# Patient Record
Sex: MALE | Race: BLACK OR AFRICAN AMERICAN | Employment: FULL TIME | URBAN - METROPOLITAN AREA
[De-identification: names, ages, dates, MRNs, and addresses within clinical notes are randomized per-mention and may not be internally consistent; named-entity substitution may affect disease eponyms.]

---

## 2017-10-14 ENCOUNTER — HOSPITAL ENCOUNTER (EMERGENCY)
Age: 47
Discharge: HOME | End: 2017-10-14
Attending: EMERGENCY MEDICINE
Payer: COMMERCIAL

## 2017-10-14 VITALS
TEMPERATURE: 98.2 F | OXYGEN SATURATION: 99 % | DIASTOLIC BLOOD PRESSURE: 74 MMHG | HEART RATE: 68 BPM | WEIGHT: 180 LBS | SYSTOLIC BLOOD PRESSURE: 124 MMHG | BODY MASS INDEX: 30.73 KG/M2 | HEIGHT: 64 IN | RESPIRATION RATE: 16 BRPM

## 2017-10-14 DIAGNOSIS — S05.02XA ABRASION OF LEFT CORNEA, INITIAL ENCOUNTER: Primary | ICD-10-CM

## 2017-10-14 DIAGNOSIS — H20.9 ANTERIOR UVEITIS: ICD-10-CM

## 2017-10-14 PROCEDURE — 74011000250 HC RX REV CODE- 250: Performed by: EMERGENCY MEDICINE

## 2017-10-14 PROCEDURE — 90471 IMMUNIZATION ADMIN: CPT | Performed by: EMERGENCY MEDICINE

## 2017-10-14 PROCEDURE — 74011250637 HC RX REV CODE- 250/637: Performed by: EMERGENCY MEDICINE

## 2017-10-14 PROCEDURE — 99284 EMERGENCY DEPT VISIT MOD MDM: CPT | Performed by: EMERGENCY MEDICINE

## 2017-10-14 PROCEDURE — 90715 TDAP VACCINE 7 YRS/> IM: CPT | Performed by: EMERGENCY MEDICINE

## 2017-10-14 PROCEDURE — 74011250636 HC RX REV CODE- 250/636: Performed by: EMERGENCY MEDICINE

## 2017-10-14 RX ORDER — TROPICAMIDE 10 MG/ML
2 SOLUTION/ DROPS OPHTHALMIC
Status: COMPLETED | OUTPATIENT
Start: 2017-10-14 | End: 2017-10-14

## 2017-10-14 RX ORDER — OXYCODONE HYDROCHLORIDE 5 MG/1
5-10 TABLET ORAL
Qty: 20 TAB | Refills: 0 | Status: SHIPPED | OUTPATIENT
Start: 2017-10-14 | End: 2018-09-13 | Stop reason: ALTCHOICE

## 2017-10-14 RX ORDER — ERYTHROMYCIN 5 MG/G
OINTMENT OPHTHALMIC
Status: DISCONTINUED | OUTPATIENT
Start: 2017-10-14 | End: 2017-10-14 | Stop reason: SDUPTHER

## 2017-10-14 RX ORDER — ERYTHROMYCIN 5 MG/G
OINTMENT OPHTHALMIC
Status: COMPLETED | OUTPATIENT
Start: 2017-10-14 | End: 2017-10-14

## 2017-10-14 RX ORDER — OXYCODONE HYDROCHLORIDE 5 MG/1
10 TABLET ORAL
Status: COMPLETED | OUTPATIENT
Start: 2017-10-14 | End: 2017-10-14

## 2017-10-14 RX ORDER — ONDANSETRON 8 MG/1
8 TABLET, ORALLY DISINTEGRATING ORAL
Status: COMPLETED | OUTPATIENT
Start: 2017-10-14 | End: 2017-10-14

## 2017-10-14 RX ORDER — BISMUTH SUBSALICYLATE 262 MG
1 TABLET,CHEWABLE ORAL DAILY
COMMUNITY
End: 2018-09-13 | Stop reason: ALTCHOICE

## 2017-10-14 RX ORDER — TETRACAINE HYDROCHLORIDE 5 MG/ML
1 SOLUTION OPHTHALMIC
Status: COMPLETED | OUTPATIENT
Start: 2017-10-14 | End: 2017-10-14

## 2017-10-14 RX ADMIN — FLUORESCEIN SODIUM 1 STRIP: 1 STRIP OPHTHALMIC at 20:58

## 2017-10-14 RX ADMIN — ERYTHROMYCIN: 5 OINTMENT OPHTHALMIC at 23:45

## 2017-10-14 RX ADMIN — OXYCODONE HYDROCHLORIDE 10 MG: 5 TABLET ORAL at 23:08

## 2017-10-14 RX ADMIN — TETANUS TOXOID, REDUCED DIPHTHERIA TOXOID AND ACELLULAR PERTUSSIS VACCINE, ADSORBED 0.5 ML: 5; 2.5; 8; 8; 2.5 SUSPENSION INTRAMUSCULAR at 23:45

## 2017-10-14 RX ADMIN — TETRACAINE HYDROCHLORIDE 1 DROP: 5 SOLUTION OPHTHALMIC at 20:58

## 2017-10-14 RX ADMIN — TROPICAMIDE 2 DROP: 10 SOLUTION/ DROPS OPHTHALMIC at 22:50

## 2017-10-14 RX ADMIN — ONDANSETRON 8 MG: 8 TABLET, ORALLY DISINTEGRATING ORAL at 23:08

## 2017-10-15 NOTE — ED NOTES
I have reviewed discharge instructions with the patient. The patient verbalized understanding. Patient left ED via ambulatory/ POV to home with spouse. Opportunity for questions and clarification provided. Patient given one scripts.

## 2017-10-15 NOTE — ED TRIAGE NOTES
Pt states that he was doing yard work last week and a branch hit him in the left eye and has been red and painful since

## 2017-10-15 NOTE — DISCHARGE INSTRUCTIONS
Use the antibiotic eye ointment 3 times a day  Pain medicines as needed  He definitely need to see the eye doctor Monday       Iritis: Care Instructions  Your Care Instructions    Iritis is an inflammation of the colored part of the eye. This part of the eye is called the iris. Iritis can cause redness and pain. It can make your eyes more sensitive to light. And it may make your pupils different sizes. Iritis is most often treated with prescription eyedrops. Treatment can usually prevent long-term problems with vision. Iritis usually lasts 6 to 8 weeks. You will need follow-up care with an eye doctor (ophthalmologist). Uveitis (say \"you-vee-EYE-tus\") and iridocyclitis (say \"qqq-zc-uzn-adryan-KLY-tus\") are other terms used to refer to this problem. Follow-up care is a key part of your treatment and safety. Be sure to make and go to all appointments, and call your doctor if you are having problems. It's also a good idea to know your test results and keep a list of the medicines you take. How can you care for yourself at home? · If the doctor gave you eyedrops, use them exactly as directed. Use the medicine for as long as instructed, even if your eye starts to look better soon. Call your doctor if you think you are having a problem with your eyedrops. Wash your hands well before and after you put in eyedrops. · To put in eyedrops or ointment:  ¨ Tilt your head back, and pull your lower eyelid down with one finger. ¨ Drop or squirt the medicine inside the lower lid. ¨ Close your eye for 30 to 60 seconds to let the drops or ointment move around. ¨ Do not touch the ointment or dropper tip to your eyelashes or any other surface. · Take an over-the-counter pain medicine, such as acetaminophen (Tylenol), ibuprofen (Advil, Motrin), or naproxen (Aleve). Read and follow all instructions on the label. · Make sure you go to all of your follow-up appointments. You will need a complete eye exam from an eye doctor.   When should you call for help? Call your doctor now or seek immediate medical care if:  · You have new or increasing eye pain. · You have vision changes in either eye. Watch closely for changes in your health, and be sure to contact your doctor if:  · You have new or worse symptoms. · You do not get better as expected. Where can you learn more? Go to http://nabor-min.info/. Enter B112 in the search box to learn more about \"Iritis: Care Instructions. \"  Current as of: March 3, 2017  Content Version: 11.3  © 8038-4212 [email or web address]. Care instructions adapted under license by [employer] (which disclaims liability or warranty for this information). If you have questions about a medical condition or this instruction, always ask your healthcare professional. [address] any warranty or liability for your use of this information. Uveitis: Care Instructions  Your Care Instructions    Uveitis (say \"you-vee-EYE-tus\") is swelling and tenderness of the middle layer of the eye. This area includes the colored part of the eye (iris), muscles, and blood vessels. One or both of your eyes may be swollen, red, and painful. You may have blurred vision. You may have gotten uveitis from an infection, but the cause is often not known. There are three types of uveitis. · Anterior uveitis is the most common type. This is pain and swelling of the front part of the eye. It is treated with eyedrops or ointment and usually lasts less than 6 weeks. · Intermediate uveitis affects the middle of the eye. It may be treated with eyedrops or with medicine given in a shot. It usually lasts longer than 6 weeks. · Posterior uveitis is the least common type. It affects the back of the eye. This is usually treated with medicine. It can last from a few weeks to a few years. It is important to treat uveitis.  Treatment can save your eyesight and avoid permanent damage to your eyes.  Follow-up care is a key part of your treatment and safety. Be sure to make and go to all appointments, and call your doctor if you are having problems. It's also a good idea to know your test results and keep a list of the medicines you take. How can you care for yourself at home? · Take your medicines exactly as prescribed. Call your doctor if you have any problems with your medicine. You will get more details on the specific medicines your doctor prescribes. · Use any prescribed eyedrops or ointments exactly as your doctor told you to. · Keep the eyedropper or bottle tip clean. · If you are using eyedrops and an ointment, put in the eyedrops before you use the ointment. · To put in eyedrops or ointment:  ¨ Tilt your head back, and pull your lower eyelid down with one finger. ¨ Drop or squirt the medicine inside the lower lid. ¨ Close your eye for 30 to 60 seconds to let the drops or ointment move around. ¨ Do not touch the ointment or dropper tip to your eyelashes or any other surface. · Wear sunglasses if light hurts your eyes. · Do not wear contact lenses until your eyes have healed. · Do not wear eye makeup until your eyes have healed. · Do not drive if you have blurred vision. When should you call for help? Call your doctor now or seek immediate medical care if:  · You have signs of an eye infection, such as:  ¨ Pus or thick discharge coming from the eye. ¨ Redness or swelling around the eye. ¨ A fever. · You have new or worse eye pain. · You have vision changes. · It feels like there is something in your eye. · Light hurts your eye. Watch closely for changes in your health, and be sure to contact your doctor if:  · You do not get better as expected. Where can you learn more? Go to http://nabor-min.info/. Enter V589 in the search box to learn more about \"Uveitis: Care Instructions. \"  Current as of: March 14, 2017  Content Version: 11.3  © 7291-6779 [employer]. Care instructions adapted under license by [employer] (which disclaims liability or warranty for this information). If you have questions about a medical condition or this instruction, always ask your healthcare professional. [address] any warranty or liability for your use of this information. Corneal Scratches: Care Instructions  Your Care Instructions    The cornea is the clear surface that covers the front of the eye. When a speck of dirt, a wood chip, an insect, or another object flies into your eye, it can cause a painful scratch on the cornea. Wearing contact lenses too long or rubbing your eyes can also scratch the cornea. Small scratches usually heal in a day or two. Deeper scratches may take longer. If you have had a foreign object removed from your eye or you have a corneal scratch, you will need to watch for infection and vision problems while your eye heals. Follow-up care is a key part of your treatment and safety. Be sure to make and go to all appointments, and call your doctor if you are having problems. It's also a good idea to know your test results and keep a list of the medicines you take. How can you care for yourself at home? · The doctor probably used a medicine during your exam to numb your eye. When it wears off in 30 to 60 minutes, your eye pain may come back. Take pain medicines exactly as directed. ¨ If the doctor gave you a prescription medicine for pain, take it as prescribed. ¨ If you are not taking a prescription pain medicine, ask your doctor if you can take an over-the-counter medicine. ¨ Do not take two or more pain medicines at the same time unless the doctor told you to. Many pain medicines have acetaminophen, which is Tylenol. Too much acetaminophen (Tylenol) can be harmful. · Do not rub your injured eye. Rubbing can make it worse. · Use the prescribed eyedrops or ointment as directed.  Be sure the dropper or bottle tip is clean. To put in eyedrops or ointment:  ¨ Tilt your head back, and pull your lower eyelid down with one finger. ¨ Drop or squirt the medicine inside the lower lid. ¨ Close your eye for 30 to 60 seconds to let the drops or ointment move around. ¨ Do not touch the ointment or dropper tip to your eyelashes or any other surface. · Do not use your contact lens in your hurt eye until your doctor says you can. Also, do not wear eye makeup until your eye has healed. · Do not drive if you have blurred vision. · Bright light may hurt. Sunglasses can help. · To prevent eye injuries in the future, wear safety glasses or goggles when you work with machines or tools, mow the lawn, or ride a bike or motorcycle. When should you call for help? Call your doctor now or seek immediate medical care if:  · You have signs of an eye infection, such as:  ¨ Pus or thick discharge coming from the eye. ¨ Redness or swelling around the eye. ¨ A fever. · You have new or worse eye pain. · You have vision changes. · It feels like there is something in your eye. · Light hurts your eye. Watch closely for changes in your health, and be sure to contact your doctor if:  · You do not get better as expected. Where can you learn more? Go to http://nabor-min.info/. Enter L391 in the search box to learn more about \"Corneal Scratches: Care Instructions. \"  Current as of: March 20, 2017  Content Version: 11.3  © 1463-1584 [employer]. Care instructions adapted under license by [employer] (which disclaims liability or warranty for this information). If you have questions about a medical condition or this instruction, always ask your healthcare professional. [address] any warranty or liability for your use of this information.

## 2017-10-19 NOTE — ED PROVIDER NOTES
Patient is a 49 y.o. male presenting with eye irritation. The history is provided by the patient. Red Eye    This is a new problem. The current episode started more than 2 days ago. The problem occurs constantly. The problem has been gradually worsening. The left eye is affected. The injury mechanism was a direct trauma. The pain is severe. Associated symptoms include foreign body sensation, photophobia, eye redness and pain. Pertinent negatives include no blurred vision, no decreased vision, no discharge and no blindness. He has tried eye drops for the symptoms. The treatment provided no relief. History reviewed. No pertinent past medical history. History reviewed. No pertinent surgical history. History reviewed. No pertinent family history. Social History     Social History    Marital status:      Spouse name: N/A    Number of children: N/A    Years of education: N/A     Occupational History    Not on file. Social History Main Topics    Smoking status: Never Smoker    Smokeless tobacco: Never Used    Alcohol use Not on file    Drug use: Not on file    Sexual activity: Not on file     Other Topics Concern    Not on file     Social History Narrative    No narrative on file         ALLERGIES: Review of patient's allergies indicates no known allergies. Review of Systems   Eyes: Positive for photophobia, pain and redness. Negative for blindness, blurred vision, discharge, itching and visual disturbance. Vitals:    10/14/17 2035 10/14/17 2352   BP: 131/81 124/74   Pulse: 72 68   Resp: 18 16   Temp: 98.2 °F (36.8 °C)    SpO2: 98% 99%   Weight: 81.6 kg (180 lb)    Height: 5' 4\" (1.626 m)             Physical Exam   Constitutional: He is oriented to person, place, and time. He appears well-developed and well-nourished. He appears distressed. HENT:   Head: Normocephalic and atraumatic.    Eyes: Conjunctivae and EOM are normal. Pupils are equal, round, and reactive to light. Right eye exhibits no discharge. Left eye exhibits no discharge. No scleral icterus. Very photophobic  Severe consensual photophobia,  +cell and flare in a.c.  Limbal abrasion at 3 and 9 o'clock   Neck: Normal range of motion. Neck supple. Pulmonary/Chest: Effort normal. No respiratory distress. Musculoskeletal: Normal range of motion. He exhibits no edema. Neurological: He is alert and oriented to person, place, and time. He exhibits normal muscle tone. cni 2-12 grossly   Skin: Skin is warm and dry. He is not diaphoretic. Psychiatric: He has a normal mood and affect. His behavior is normal.   Nursing note and vitals reviewed. MDM  Number of Diagnoses or Management Options  Abrasion of left cornea, initial encounter:   Anterior uveitis:   Diagnosis management comments: Medical decision making note:  Corneal abrasion from tree limb,  Severe uveitis  Va is o.k.  Dilate,  analgese, Abx, td, ophtho referral,  This concludes the \"medical decision making note\" part of this emergency department visit note.       ED Course       Procedures

## 2018-06-30 ENCOUNTER — HOSPITAL ENCOUNTER (EMERGENCY)
Age: 48
Discharge: LEFT BEFORE DISCHARGE | End: 2018-06-30
Attending: EMERGENCY MEDICINE
Payer: COMMERCIAL

## 2018-06-30 VITALS
HEIGHT: 64 IN | OXYGEN SATURATION: 99 % | HEART RATE: 77 BPM | RESPIRATION RATE: 16 BRPM | BODY MASS INDEX: 29.88 KG/M2 | DIASTOLIC BLOOD PRESSURE: 81 MMHG | TEMPERATURE: 98.1 F | WEIGHT: 175 LBS | SYSTOLIC BLOOD PRESSURE: 153 MMHG

## 2018-06-30 PROCEDURE — 75810000275 HC EMERGENCY DEPT VISIT NO LEVEL OF CARE: Performed by: EMERGENCY MEDICINE

## 2018-07-01 NOTE — ED TRIAGE NOTES
Pt states he has been having sharp pain in his neck like a crick in his name for the last week. States the pain is worse when he turns his head to the right and radiates down to shoulders. Denies taking anything for pain. Denies fall, MVA or altercation.

## 2018-10-16 PROBLEM — K62.5 BRBPR (BRIGHT RED BLOOD PER RECTUM): Status: ACTIVE | Noted: 2018-10-16

## 2018-10-16 PROBLEM — Z80.0 FAMILY HX OF COLON CANCER: Status: ACTIVE | Noted: 2018-10-16

## 2018-11-05 ENCOUNTER — HOSPITAL ENCOUNTER (OUTPATIENT)
Age: 48
Setting detail: OUTPATIENT SURGERY
Discharge: HOME | End: 2018-11-05
Attending: SURGERY | Admitting: SURGERY
Payer: COMMERCIAL

## 2018-11-05 VITALS
TEMPERATURE: 98 F | RESPIRATION RATE: 13 BRPM | WEIGHT: 171 LBS | BODY MASS INDEX: 29.19 KG/M2 | HEIGHT: 64 IN | SYSTOLIC BLOOD PRESSURE: 109 MMHG | DIASTOLIC BLOOD PRESSURE: 57 MMHG | OXYGEN SATURATION: 99 % | HEART RATE: 55 BPM

## 2018-11-05 PROCEDURE — 76040000019: Performed by: SURGERY

## 2018-11-05 PROCEDURE — 74011250636 HC RX REV CODE- 250/636: Performed by: SURGERY

## 2018-11-05 PROCEDURE — 74011250636 HC RX REV CODE- 250/636

## 2018-11-05 PROCEDURE — G0500 MOD SEDAT ENDO SERVICE >5YRS: HCPCS | Performed by: SURGERY

## 2018-11-05 RX ORDER — MIDAZOLAM HYDROCHLORIDE 1 MG/ML
.25-5 INJECTION, SOLUTION INTRAMUSCULAR; INTRAVENOUS
Status: DISCONTINUED | OUTPATIENT
Start: 2018-11-05 | End: 2018-11-05 | Stop reason: HOSPADM

## 2018-11-05 RX ORDER — FLUMAZENIL 0.1 MG/ML
0.2 INJECTION INTRAVENOUS
Status: DISCONTINUED | OUTPATIENT
Start: 2018-11-05 | End: 2018-11-05 | Stop reason: HOSPADM

## 2018-11-05 RX ORDER — SODIUM CHLORIDE 9 MG/ML
25 INJECTION, SOLUTION INTRAVENOUS CONTINUOUS
Status: DISCONTINUED | OUTPATIENT
Start: 2018-11-05 | End: 2018-11-05 | Stop reason: HOSPADM

## 2018-11-05 RX ORDER — FENTANYL CITRATE 50 UG/ML
25-100 INJECTION, SOLUTION INTRAMUSCULAR; INTRAVENOUS
Status: DISCONTINUED | OUTPATIENT
Start: 2018-11-05 | End: 2018-11-05

## 2018-11-05 RX ORDER — SODIUM CHLORIDE 0.9 % (FLUSH) 0.9 %
5-10 SYRINGE (ML) INJECTION AS NEEDED
Status: DISCONTINUED | OUTPATIENT
Start: 2018-11-05 | End: 2018-11-05 | Stop reason: HOSPADM

## 2018-11-05 RX ORDER — SODIUM CHLORIDE 0.9 % (FLUSH) 0.9 %
5-10 SYRINGE (ML) INJECTION EVERY 8 HOURS
Status: DISCONTINUED | OUTPATIENT
Start: 2018-11-05 | End: 2018-11-05 | Stop reason: HOSPADM

## 2018-11-05 RX ORDER — NALOXONE HYDROCHLORIDE 0.4 MG/ML
0.4 INJECTION, SOLUTION INTRAMUSCULAR; INTRAVENOUS; SUBCUTANEOUS
Status: DISCONTINUED | OUTPATIENT
Start: 2018-11-05 | End: 2018-11-05 | Stop reason: HOSPADM

## 2018-11-05 RX ORDER — FENTANYL CITRATE 50 UG/ML
25-100 INJECTION, SOLUTION INTRAMUSCULAR; INTRAVENOUS
Status: DISCONTINUED | OUTPATIENT
Start: 2018-11-05 | End: 2018-11-05 | Stop reason: HOSPADM

## 2018-11-05 RX ADMIN — FENTANYL CITRATE 75 MCG: 50 INJECTION, SOLUTION INTRAMUSCULAR; INTRAVENOUS at 09:56

## 2018-11-05 RX ADMIN — SODIUM CHLORIDE 25 ML/HR: 900 INJECTION, SOLUTION INTRAVENOUS at 09:41

## 2018-11-05 RX ADMIN — MIDAZOLAM HYDROCHLORIDE 3 MG: 1 INJECTION, SOLUTION INTRAMUSCULAR; INTRAVENOUS at 09:53

## 2018-11-05 RX ADMIN — MIDAZOLAM HYDROCHLORIDE 2 MG: 1 INJECTION, SOLUTION INTRAMUSCULAR; INTRAVENOUS at 09:57

## 2018-11-05 RX ADMIN — FENTANYL CITRATE 25 MCG: 50 INJECTION, SOLUTION INTRAMUSCULAR; INTRAVENOUS at 09:57

## 2018-11-05 NOTE — H&P
Karli Patient 
 
11/5/2018 Subjective:  
 
Patient is a 51 y.o. male who was sent by their primary care physician for screening colonoscopy. Pt denies any symptoms of abdominal pain, change in bowel habits, blood per rectum, unexplained weight loss, or other symptoms that would be of concern for colon cancer. Patient Active Problem List  
 Diagnosis Date Noted  BRBPR (bright red blood per rectum) 10/16/2018  Family hx of colon cancer 10/16/2018 No past medical history on file. No past surgical history on file. Cannot display prior to admission medications because the patient has not been admitted in this contact. No Known Allergies Social History Tobacco Use  Smoking status: Never Smoker  Smokeless tobacco: Never Used Substance Use Topics  Alcohol use: Not on file Social History Social History Narrative  Not on file No family history on file. No current facility-administered medications for this encounter. Current Outpatient Medications Medication Sig  
 aspirin delayed-release 81 mg tablet Once daily cheaper over-the-counter  multivitamin (ONE A DAY) tablet Once daily cheaper over-the-counter Review of Systems Pertinent items are noted in HPI. Objective: There were no vitals filed for this visit. PHYSICAL EXAM  
 
Gen- the patient is well developed and in no acute distress HEENT- PERRL, EOMI, no scleral icterus 
     nose without alar flaring or epistaxis 
                oral muscosa moist without cyanosis Neck- no JVD or retractions Lungs-clear Heart- RRR without M,G,R Abd- soft and non-tender; with positive bowel sounds. Ext- warm without cyanosis. There is no lower leg edema. Skin- no jaundice or rashes, no wounds Neuro- alert and oriented x 3. No gross sensorimotor deficits are present. Plan:  
 
Age appropriate screening colonoscopy.  I have discussed the risks, benefits and alternatives of surgery. Pt understands and wishes to proceed. Consent obtained Zulma Hicks MD

## 2018-11-05 NOTE — DISCHARGE INSTRUCTIONS
Gastrointestinal Colonoscopy/Flexible Sigmoidoscopy - Lower Exam Discharge Instructions  1. Call Dr. Adore Jama at [phone] for any problems or questions. 2. Contact the doctors office for follow up appointment as directed  3. Medication may cause drowsiness for several hours, therefore, do not drive or operate machinery for remainder of the day. 4. No alcohol today. 5. Ordinarily, you may resume regular diet and activity after exam unless otherwise specified by your physician. 6. Because of air put into your colon during exam, you may experience some abdominal distension, relieved by the passage of gas, for several hours. 7. Contact your physician if you have any of the following:  a. Excessive amount of bleeding - large amount when having a bowel movement. Occasional specks of blood with bowel movement would not be unusual.  b. Severe abdominal pain  c.  Fever or Chills  Any additional instructions:  Repeat colonoscopy in ten years

## 2018-11-05 NOTE — ROUTINE PROCESS
VSS. No complaints noted. Education reviewed and signed with stewart. Pt discharged via wheelchair by Glenn.

## 2018-11-27 NOTE — PROCEDURES
[address] [facility] [phone]  Colonoscopy Report  Hollis Mojica    Admit date: 2018    MRN: [id number]     : 1970     Age: 51 y.o.          2018  Screening colonoscopy   Post Procedure Diagnosis:Normal colon  Repeat exam in 10 years    Indications:  Pt was sent by their primary care physician for screening colonoscopy. Risks and benefits of the procedure were discussed, and consent was obtained. Procedure:  Pt was brought back to the endoscopy suite, placed in left lateral decubitus position, and IV sedation was administered. 5mg versed and 100mcg fentanyl were administered intravenously, and vital signs were monitored continuously during this time. Digital rectal exam was performed with no findings. Colonoscope was inserted into the anus, and advanced with insufflation around the entire length of the colon to the ileocecal valve. The valve was positively identified with the visual landmarks and palpation in the RLQ. The scope was then withdrawn slowly, inspecting the mucosa for any irregularities. Findings included normal colon. Bowel prep was good . Insufflation was suctioned out at the completion of the procedure, and the patient was transferred to the recovery area in stable condition. There were no complications. Pt tolerated the procedure well. Specimens none    Estimated Blood Loss: 0-minimum.     Stanley Bass MD

## 2019-02-25 ENCOUNTER — HOSPITAL ENCOUNTER (OUTPATIENT)
Dept: NON INVASIVE DIAGNOSTICS | Age: 49
Discharge: HOME | End: 2019-02-25
Attending: FAMILY MEDICINE
Payer: COMMERCIAL

## 2019-02-25 DIAGNOSIS — R06.02 SOB (SHORTNESS OF BREATH): ICD-10-CM

## 2019-02-25 DIAGNOSIS — R94.31 ABNORMAL EKG: ICD-10-CM

## 2019-02-25 PROCEDURE — 93306 TTE W/DOPPLER COMPLETE: CPT

## 2021-08-25 ENCOUNTER — HOSPITAL ENCOUNTER (EMERGENCY)
Age: 51
Discharge: HOME | End: 2021-08-26
Attending: EMERGENCY MEDICINE | Admitting: EMERGENCY MEDICINE
Payer: COMMERCIAL

## 2021-08-25 ENCOUNTER — APPOINTMENT (OUTPATIENT)
Dept: GENERAL RADIOLOGY | Age: 51
End: 2021-08-25
Attending: EMERGENCY MEDICINE
Payer: COMMERCIAL

## 2021-08-25 DIAGNOSIS — R07.9 CHEST PAIN, UNSPECIFIED TYPE: Primary | ICD-10-CM

## 2021-08-25 LAB
ALBUMIN SERPL-MCNC: 4.2 G/DL (ref 3.5–5)
ALBUMIN/GLOB SERPL: 0.9 {RATIO} (ref 1.2–3.5)
ALP SERPL-CCNC: 81 U/L (ref 50–136)
ALT SERPL-CCNC: 45 U/L (ref 12–65)
ANION GAP SERPL CALC-SCNC: 4 MMOL/L (ref 7–16)
AST SERPL-CCNC: 26 U/L (ref 15–37)
BASOPHILS # BLD: 0 K/UL (ref 0–0.2)
BASOPHILS NFR BLD: 1 % (ref 0–2)
BILIRUB SERPL-MCNC: 0.3 MG/DL (ref 0.2–1.1)
BUN SERPL-MCNC: 11 MG/DL (ref 6–23)
CALCIUM SERPL-MCNC: 9.6 MG/DL (ref 8.3–10.4)
CHLORIDE SERPL-SCNC: 108 MMOL/L (ref 98–107)
CO2 SERPL-SCNC: 29 MMOL/L (ref 21–32)
CREAT SERPL-MCNC: 1.37 MG/DL (ref 0.8–1.5)
DIFFERENTIAL METHOD BLD: ABNORMAL
EOSINOPHIL # BLD: 0.1 K/UL (ref 0–0.8)
EOSINOPHIL NFR BLD: 2 % (ref 0.5–7.8)
ERYTHROCYTE [DISTWIDTH] IN BLOOD BY AUTOMATED COUNT: 13.3 % (ref 11.9–14.6)
GLOBULIN SER CALC-MCNC: 4.8 G/DL (ref 2.3–3.5)
GLUCOSE SERPL-MCNC: 85 MG/DL (ref 65–100)
HCT VFR BLD AUTO: 45.1 % (ref 41.1–50.3)
HGB BLD-MCNC: 14.4 G/DL (ref 13.6–17.2)
IMM GRANULOCYTES # BLD AUTO: 0 K/UL (ref 0–0.5)
IMM GRANULOCYTES NFR BLD AUTO: 0 % (ref 0–5)
LIPASE SERPL-CCNC: 168 U/L (ref 73–393)
LYMPHOCYTES # BLD: 3.3 K/UL (ref 0.5–4.6)
LYMPHOCYTES NFR BLD: 46 % (ref 13–44)
MAGNESIUM SERPL-MCNC: 2 MG/DL (ref 1.8–2.4)
MCH RBC QN AUTO: 27.2 PG (ref 26.1–32.9)
MCHC RBC AUTO-ENTMCNC: 31.9 G/DL (ref 31.4–35)
MCV RBC AUTO: 85.1 FL (ref 79.6–97.8)
MONOCYTES # BLD: 0.5 K/UL (ref 0.1–1.3)
MONOCYTES NFR BLD: 7 % (ref 4–12)
NEUTS SEG # BLD: 3.2 K/UL (ref 1.7–8.2)
NEUTS SEG NFR BLD: 45 % (ref 43–78)
NRBC # BLD: 0 K/UL (ref 0–0.2)
PLATELET # BLD AUTO: 335 K/UL (ref 150–450)
PMV BLD AUTO: 8.8 FL (ref 9.4–12.3)
POTASSIUM SERPL-SCNC: 3.9 MMOL/L (ref 3.5–5.1)
PROT SERPL-MCNC: 9 G/DL (ref 6.3–8.2)
RBC # BLD AUTO: 5.3 M/UL (ref 4.23–5.6)
SODIUM SERPL-SCNC: 141 MMOL/L (ref 136–145)
TROPONIN-HIGH SENSITIVITY: 34.8 PG/ML (ref 0–14)
WBC # BLD AUTO: 7.3 K/UL (ref 4.3–11.1)

## 2021-08-25 PROCEDURE — 71046 X-RAY EXAM CHEST 2 VIEWS: CPT

## 2021-08-25 PROCEDURE — 80053 COMPREHEN METABOLIC PANEL: CPT

## 2021-08-25 PROCEDURE — 85025 COMPLETE CBC W/AUTO DIFF WBC: CPT

## 2021-08-25 PROCEDURE — 74011250637 HC RX REV CODE- 250/637: Performed by: EMERGENCY MEDICINE

## 2021-08-25 PROCEDURE — 84484 ASSAY OF TROPONIN QUANT: CPT

## 2021-08-25 PROCEDURE — 83735 ASSAY OF MAGNESIUM: CPT

## 2021-08-25 PROCEDURE — 83690 ASSAY OF LIPASE: CPT

## 2021-08-25 PROCEDURE — 99284 EMERGENCY DEPT VISIT MOD MDM: CPT

## 2021-08-25 PROCEDURE — 93005 ELECTROCARDIOGRAM TRACING: CPT | Performed by: EMERGENCY MEDICINE

## 2021-08-25 RX ORDER — GUAIFENESIN 100 MG/5ML
324 LIQUID (ML) ORAL
Status: COMPLETED | OUTPATIENT
Start: 2021-08-25 | End: 2021-08-25

## 2021-08-25 RX ORDER — SODIUM CHLORIDE 0.9 % (FLUSH) 0.9 %
5-10 SYRINGE (ML) INJECTION EVERY 8 HOURS
Status: DISCONTINUED | OUTPATIENT
Start: 2021-08-25 | End: 2021-08-26 | Stop reason: HOSPADM

## 2021-08-25 RX ORDER — SODIUM CHLORIDE 0.9 % (FLUSH) 0.9 %
5-10 SYRINGE (ML) INJECTION AS NEEDED
Status: DISCONTINUED | OUTPATIENT
Start: 2021-08-25 | End: 2021-08-26 | Stop reason: HOSPADM

## 2021-08-25 RX ADMIN — ASPIRIN 324 MG: 81 TABLET, CHEWABLE ORAL at 22:05

## 2021-08-26 VITALS
RESPIRATION RATE: 16 BRPM | TEMPERATURE: 98.3 F | BODY MASS INDEX: 31.58 KG/M2 | WEIGHT: 185 LBS | OXYGEN SATURATION: 96 % | DIASTOLIC BLOOD PRESSURE: 79 MMHG | HEART RATE: 54 BPM | SYSTOLIC BLOOD PRESSURE: 155 MMHG | HEIGHT: 64 IN

## 2021-08-26 LAB
ATRIAL RATE: 62 BPM
CALCULATED P AXIS, ECG09: 69 DEGREES
CALCULATED R AXIS, ECG10: 47 DEGREES
CALCULATED T AXIS, ECG11: 27 DEGREES
DIAGNOSIS, 93000: NORMAL
P-R INTERVAL, ECG05: 190 MS
Q-T INTERVAL, ECG07: 394 MS
QRS DURATION, ECG06: 86 MS
QTC CALCULATION (BEZET), ECG08: 399 MS
TROPONIN-HIGH SENSITIVITY: 31.1 PG/ML (ref 0–14)
VENTRICULAR RATE, ECG03: 62 BPM

## 2021-08-26 NOTE — ED PROVIDER NOTES
Patient with no past medical history and non-smoker presents with left-sided chest tightness going to the left elbow lasting for couple minutes at a time intermittently over the past 2 days. No specific trigger. No nausea numbness shortness of breath or diaphoresis. 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 No heavy lifting recently. Last episode just before arrival here. The history is provided by the patient. No  was used. Chest Pain   This is a new problem. The current episode started 2 days ago. The problem has been resolved. Duration of episode(s) is 3 minutes. The problem occurs daily. The pain is associated with normal activity. The pain is present in the left side. The pain is mild. The quality of the pain is described as tightness. The pain radiates to the left arm. Pertinent negatives include no abdominal pain, no back pain, no cough, no diaphoresis, no dizziness, no exertional chest pressure, no fever, no headaches, no irregular heartbeat, no leg pain, no lower extremity edema, no malaise/fatigue, no nausea, no numbness, no palpitations, no shortness of breath, no vomiting and no weakness. He has tried nothing for the symptoms. Risk factors include male gender. His past medical history does not include DM or HTN. Pertinent negatives include no cardiac stents. No past medical history on file.     Past Surgical History:   Procedure Laterality Date    COLONOSCOPY N/A 11/5/2018    COLONOSCOPY performed by Henry Chapman MD at [facility] END[facility]COPY         Family History:   Family history unknown: Yes       Social History     Socioeconomic History    Marital status:      Spouse name: Not on file    Number of children: Not on file    Years of education: Not on file    Highest education level: Not on file   Occupational History    Not on file   Tobacco Use    Smoking status: Never Smoker    Smokeless tobacco: Never Used   Substance and Sexual Activity    Alcohol use: No    Drug use: No    Sexual activity: Not on file   Other Topics Concern    Not on file   Social History Narrative    Not on file     Social Determinants of Health     Financial Resource Strain:     Difficulty of Paying Living Expenses:    Food Insecurity:     Worried About Running Out of Food in the Last Year:     [address] in the Last Year:    Transportation Needs:     Lack of Transportation (Medical):  Lack of Transportation (Non-Medical):    Physical Activity:     Days of Exercise per Week:     Minutes of Exercise per Session:    Stress:     Feeling of Stress :    Social Connections:     Frequency of Communication with Friends and Family:     Frequency of Social Gatherings with Friends and Family:     Attends Orthodox Services:     Active Member of Clubs or Organizations:     Attends Club or Organization Meetings:     Marital Status:    Intimate Partner Violence:     Fear of Current or Ex-Partner:     Emotionally Abused:     Physically Abused:     Sexually Abused: ALLERGIES: Patient has no known allergies. Review of Systems   Constitutional: Negative for chills, diaphoresis, fever and malaise/fatigue. HENT: Negative for rhinorrhea and sore throat. Eyes: Negative for pain and redness. Respiratory: Negative for cough, chest tightness, shortness of breath and wheezing. Cardiovascular: Positive for chest pain. Negative for palpitations and leg swelling. Gastrointestinal: Negative for abdominal pain, diarrhea, nausea and vomiting. Genitourinary: Negative for dysuria and hematuria. Musculoskeletal: Negative for back pain, gait problem, neck pain and neck stiffness. Skin: Negative for color change and rash. Neurological: Negative for dizziness, weakness, numbness and headaches. All other systems reviewed and are negative.       Vitals:    08/25/21 2124   BP: (!) 187/84   Pulse: 64   Resp: 16   Temp: 97.8 °F (36.6 °C)   SpO2: 99%   Weight: 83.9 kg (185 lb)   Height: 5' 4\" (1.626 m)            Physical Exam  Constitutional:       Appearance: Normal appearance. He is well-developed. HENT:      Head: Normocephalic and atraumatic. Cardiovascular:      Rate and Rhythm: Normal rate and regular rhythm. Pulmonary:      Effort: Pulmonary effort is normal.      Breath sounds: Normal breath sounds. Chest:      Chest wall: No tenderness. Abdominal:      General: Bowel sounds are normal.      Palpations: Abdomen is soft. Tenderness: There is no abdominal tenderness. Musculoskeletal:         General: Normal range of motion. Cervical back: Normal range of motion and neck supple. Skin:     General: Skin is warm and dry. Neurological:      General: No focal deficit present. Mental Status: He is alert and oriented to person, place, and time. MDM  Number of Diagnoses or Management Options  Diagnosis management comments: Patient with no EKG changes and 2 - troponins. Feeling well here. Will discharge. Amount and/or Complexity of Data Reviewed  Clinical lab tests: ordered and reviewed  Tests in the radiology section of CPT®: ordered and reviewed  Tests in the medicine section of CPT®: ordered and reviewed    Patient Progress  Patient progress: stable         Procedures      EKG: normal sinus rhythm, nonspecific ST and T waves changes. Rate 62. XR CHEST PA LAT (Final result)  Result time 08/25/21 22:01:55  Final result by Leonarda Quintana MD (08/25/21 22:01:55)                Impression:    Unremarkable two-view chest.               Narrative:    History: Chest Pain     Two views chest     Findings: The lungs are well expanded and clear.  The cardiac silhouette, and   mediastinal contour, and osseous structures are normal.                   Results Include:    Recent Results (from the past 24 hour(s))   TROPONIN-HIGH SENSITIVITY    Collection Time: 08/25/21  9:26 PM   Result Value Ref Range    Troponin-High Sensitivity 34.8 (H) 0 - 14 pg/mL   CBC WITH AUTOMATED DIFF    Collection Time: 08/25/21  9:26 PM   Result Value Ref Range    WBC 7.3 4.3 - 11.1 K/uL    RBC 5.30 4.23 - 5.6 M/uL    HGB 14.4 13.6 - 17.2 g/dL    HCT 45.1 41.1 - 50.3 %    MCV 85.1 79.6 - 97.8 FL    MCH 27.2 26.1 - 32.9 PG    MCHC 31.9 31.4 - 35.0 g/dL    RDW 13.3 11.9 - 14.6 %    PLATELET 834 390 - 416 K/uL    MPV 8.8 (L) 9.4 - 12.3 FL    ABSOLUTE NRBC 0.00 0.0 - 0.2 K/uL    DF AUTOMATED      NEUTROPHILS 45 43 - 78 %    LYMPHOCYTES 46 (H) 13 - 44 %    MONOCYTES 7 4.0 - 12.0 %    EOSINOPHILS 2 0.5 - 7.8 %    BASOPHILS 1 0.0 - 2.0 %    IMMATURE GRANULOCYTES 0 0.0 - 5.0 %    ABS. NEUTROPHILS 3.2 1.7 - 8.2 K/UL    ABS. LYMPHOCYTES 3.3 0.5 - 4.6 K/UL    ABS. MONOCYTES 0.5 0.1 - 1.3 K/UL    ABS. EOSINOPHILS 0.1 0.0 - 0.8 K/UL    ABS. BASOPHILS 0.0 0.0 - 0.2 K/UL    ABS. IMM. GRANS. 0.0 0.0 - 0.5 K/UL   METABOLIC PANEL, COMPREHENSIVE    Collection Time: 08/25/21  9:26 PM   Result Value Ref Range    Sodium 141 136 - 145 mmol/L    Potassium 3.9 3.5 - 5.1 mmol/L    Chloride 108 (H) 98 - 107 mmol/L    CO2 29 21 - 32 mmol/L    Anion gap 4 (L) 7 - 16 mmol/L    Glucose 85 65 - 100 mg/dL    BUN 11 6 - 23 MG/DL    Creatinine 1.37 0.8 - 1.5 MG/DL    GFR est AA >60 >60 ml/min/1.73m2    GFR est non-AA 58 (L) >60 ml/min/1.73m2    Calcium 9.6 8.3 - 10.4 MG/DL    Bilirubin, total 0.3 0.2 - 1.1 MG/DL    ALT (SGPT) 45 12 - 65 U/L    AST (SGOT) 26 15 - 37 U/L    Alk.  phosphatase 81 50 - 136 U/L    Protein, total 9.0 (H) 6.3 - 8.2 g/dL    Albumin 4.2 3.5 - 5.0 g/dL    Globulin 4.8 (H) 2.3 - 3.5 g/dL    A-G Ratio 0.9 (L) 1.2 - 3.5     LIPASE    Collection Time: 08/25/21  9:26 PM   Result Value Ref Range    Lipase 168 73 - 393 U/L   MAGNESIUM    Collection Time: 08/25/21  9:26 PM   Result Value Ref Range    Magnesium 2.0 1.8 - 2.4 mg/dL   EKG, 12 LEAD, INITIAL    Collection Time: 08/25/21  9:28 PM   Result Value Ref Range    Ventricular Rate 62 BPM    Atrial Rate 62 BPM    P-R Interval 190 ms    QRS Duration 86 ms    Q-T Interval 394 ms    QTC Calculation (Bezet) 399 ms    Calculated P Axis 69 degrees    Calculated R Axis 47 degrees    Calculated T Axis 27 degrees    Diagnosis       Normal sinus rhythm  Normal ECG  No previous ECGs available     TROPONIN-HIGH SENSITIVITY    Collection Time: 08/25/21 11:41 PM   Result Value Ref Range    Troponin-High Sensitivity 31.1 (H) 0 - 14 pg/mL

## 2021-08-26 NOTE — ED NOTES
I have reviewed discharge instructions with the patient. The patient verbalized understanding. Patient left ED via Discharge Method: ambulatory to Home with self. Opportunity for questions and clarification provided. Patient given 0 scripts. To continue your aftercare when you leave the hospital, you may receive an automated call from our care team to check in on how you are doing. This is a free service and part of our promise to provide the best care and service to meet your aftercare needs.  If you have questions, or wish to unsubscribe from this service please call [phone]. Thank you for Choosing our [facility] Emergency Department.

## 2021-08-26 NOTE — ED TRIAGE NOTES
Arrives with face mask in place. Reports left sided chest \"tightness\", radiating down to left elbow. Onset yesterday. States intermittent since onset. Increased pain and tightness with movement of left arm. 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 Denies heavy lifting. Denies shortness of breath, cough, fever/chills, n/v/d, lightheadedness/dizziness. Denies cardiac hx.

## 2022-03-18 PROBLEM — Z80.0 FAMILY HX OF COLON CANCER: Status: ACTIVE | Noted: 2018-10-16

## 2022-03-19 PROBLEM — K62.5 BRBPR (BRIGHT RED BLOOD PER RECTUM): Status: ACTIVE | Noted: 2018-10-16

## 2025-01-25 ENCOUNTER — HOSPITAL ENCOUNTER (EMERGENCY)
Age: 55
Discharge: HOME | End: 2025-01-26
Payer: COMMERCIAL

## 2025-01-25 ENCOUNTER — APPOINTMENT (OUTPATIENT)
Dept: GENERAL RADIOLOGY | Age: 55
End: 2025-01-25
Payer: COMMERCIAL

## 2025-01-25 DIAGNOSIS — M25.562 ACUTE PAIN OF LEFT KNEE: Primary | ICD-10-CM

## 2025-01-25 PROCEDURE — 96372 THER/PROPH/DIAG INJ SC/IM: CPT

## 2025-01-25 PROCEDURE — 99284 EMERGENCY DEPT VISIT MOD MDM: CPT

## 2025-01-25 PROCEDURE — 73562 X-RAY EXAM OF KNEE 3: CPT

## 2025-01-25 PROCEDURE — 6360000002 HC RX W HCPCS: Performed by: PHYSICIAN ASSISTANT

## 2025-01-25 RX ORDER — MELOXICAM 15 MG/1
15 TABLET ORAL DAILY
Qty: 90 TABLET | Refills: 1 | Status: SHIPPED | OUTPATIENT
Start: 2025-01-25

## 2025-01-25 RX ORDER — KETOROLAC TROMETHAMINE 30 MG/ML
30 INJECTION, SOLUTION INTRAMUSCULAR; INTRAVENOUS ONCE
Status: COMPLETED | OUTPATIENT
Start: 2025-01-25 | End: 2025-01-25

## 2025-01-25 RX ADMIN — KETOROLAC TROMETHAMINE 30 MG: 30 INJECTION, SOLUTION INTRAMUSCULAR at 22:22

## 2025-01-25 ASSESSMENT — LIFESTYLE VARIABLES
HOW OFTEN DO YOU HAVE A DRINK CONTAINING ALCOHOL: NEVER
HOW MANY STANDARD DRINKS CONTAINING ALCOHOL DO YOU HAVE ON A TYPICAL DAY: PATIENT DOES NOT DRINK

## 2025-01-25 ASSESSMENT — PAIN DESCRIPTION - ORIENTATION
ORIENTATION: LEFT
ORIENTATION: LEFT

## 2025-01-25 ASSESSMENT — PAIN DESCRIPTION - DESCRIPTORS
DESCRIPTORS: ACHING
DESCRIPTORS: ACHING

## 2025-01-25 ASSESSMENT — PAIN DESCRIPTION - PAIN TYPE: TYPE: ACUTE PAIN

## 2025-01-25 ASSESSMENT — PAIN - FUNCTIONAL ASSESSMENT: PAIN_FUNCTIONAL_ASSESSMENT: 0-10

## 2025-01-25 ASSESSMENT — PAIN SCALES - GENERAL
PAINLEVEL_OUTOF10: 3
PAINLEVEL_OUTOF10: 10

## 2025-01-25 ASSESSMENT — PAIN DESCRIPTION - LOCATION
LOCATION: KNEE
LOCATION: KNEE;LEG

## 2025-01-26 VITALS
RESPIRATION RATE: 19 BRPM | BODY MASS INDEX: 31.58 KG/M2 | WEIGHT: 185 LBS | TEMPERATURE: 98.1 F | OXYGEN SATURATION: 97 % | DIASTOLIC BLOOD PRESSURE: 71 MMHG | SYSTOLIC BLOOD PRESSURE: 146 MMHG | HEIGHT: 64 IN | HEART RATE: 97 BPM

## 2025-01-26 NOTE — ED TRIAGE NOTES
C/o Left knee and leg pain x 4 days, no known injury, reports he was seen at [facility] and  with a sprain and given a Steroid shot and pain medication with improvement that day, pain and swelling noted again the next day.

## 2025-01-26 NOTE — ED NOTES
Patient mobility status  with no difficulty.     I have reviewed discharge instructions with the patient.  The patient verbalized understanding.    Patient left ED via Discharge Method: ambulatory to Home with Spouse.    Opportunity for questions and clarification provided.     Patient given 1 scripts.              Christopher, Yue, RN  01/26/25 0014

## 2025-01-26 NOTE — DISCHARGE INSTRUCTIONS
Everything looks great this evening. Again, no abnormality on XR. I am going to send a referral for you to orthopedics and I would like you to follow up with them. Begin taking the antiinflammatory medication to help with the pain and swelling. Elevate your knee when not using it and some mild compression and icepak may be of some benefit.

## 2025-01-26 NOTE — ED PROVIDER NOTES
Emergency Department Provider Note       PCP: No primary care provider on file.   Age: 54 y.o.   Sex: male     DISPOSITION Decision To Discharge 01/25/2025 11:45:50 PM   DISPOSITION CONDITION Stable            ICD-10-CM    1. Acute pain of left knee  M25.562 [facility]          Medical Decision Making     Presents with left knee swelling. Started a few days ago no associated trauma. XR negative here for anything acute. Will begin on antiinflammatory medications and send referral for orthopedic outpatient follow up. D/w patient, amenable to discharge and antiinflammatory medication initiation.      1 acute complicated illness or injury.  Over the counter drug management performed.  Shared medical decision making was utilized in creating the patients health plan today.  I independently ordered and reviewed each unique test.  I interpreted the X-rays no fracture.    History     This is a 54-year-old male presents with complaints of left-sided knee pain.  Reports about 2 days ago developed the left knee pain.  He went to outside facility was given a steroid shot as well as anti-inflammatory medication after an unremarkable x-ray.  He was not sent home with any medication.  He reports that the steroid shot was into his hip and not into the joint itself.  States initial improvement however pain returned yesterday.  Patient works construction.  He does not note any specific injury to the knee itself that would be associated with the pain and swelling.  He is not taking any medications at home for his symptoms.    The history is provided by the patient. No  was used.     Physical Exam     Vitals signs and nursing note reviewed:  Vitals:    01/25/25 2147   BP: 112/88   Pulse: 83   Resp: 18   Temp: 98.1 °F (36.7 °C)   TempSrc: Oral   SpO2: 100%   Weight: 83.9 kg (185 lb)   Height: 1.626 m (5' 4\")      Physical Exam  Vitals and nursing note reviewed.   Constitutional:

## 2025-02-12 ENCOUNTER — OFFICE VISIT (OUTPATIENT)
Dept: ORTHOPEDIC SURGERY | Age: 55
End: 2025-02-12
Payer: COMMERCIAL

## 2025-02-12 DIAGNOSIS — M17.12 OSTEOARTHRITIS OF LEFT KNEE, UNSPECIFIED OSTEOARTHRITIS TYPE: ICD-10-CM

## 2025-02-12 DIAGNOSIS — M25.562 LEFT KNEE PAIN, UNSPECIFIED CHRONICITY: Primary | ICD-10-CM

## 2025-02-12 PROCEDURE — 20611 DRAIN/INJ JOINT/BURSA W/US: CPT | Performed by: PHYSICIAN ASSISTANT

## 2025-02-12 PROCEDURE — 99204 OFFICE O/P NEW MOD 45 MIN: CPT | Performed by: PHYSICIAN ASSISTANT

## 2025-02-12 RX ORDER — TRIAMCINOLONE ACETONIDE 40 MG/ML
40 INJECTION, SUSPENSION INTRA-ARTICULAR; INTRAMUSCULAR ONCE
Status: COMPLETED | OUTPATIENT
Start: 2025-02-12 | End: 2025-02-12

## 2025-02-12 RX ADMIN — TRIAMCINOLONE ACETONIDE 40 MG: 40 INJECTION, SUSPENSION INTRA-ARTICULAR; INTRAMUSCULAR at 09:17

## 2025-02-12 NOTE — PROGRESS NOTES
strength is good.  Sensation is intact to light touch bilaterally.  Their judgment and insight are normal.  They are oriented to time, place and person.  Their memory is good and the mood and affect appropriate.    X-RAY: Views of the left knee are reviewed.  4 views standing reveal some lateral compartment and patellofemoral joint space loss, with minimal eburnated bone and no osteophyte formation present.     X-ray impression:  Mild left knee degenerative joint disease.    IMPRESSION:    Diagnosis Orders   1. Left knee pain, unspecified chronicity  XR KNEE LEFT (MIN 4 VIEWS)      2. Osteoarthritis of left knee, unspecified osteoarthritis type            RECOMMENDATIONS:    Reviewed x-ray findings with the patient.  The concerns with functional limitations are increasing and response is variable with conservative measures. He is not limited to warrant any type of surgical consideration.  We will additionally try injection therapies as we process management of this progressive and chronic condition.    Procedure Note: Time out was performed which included identifying the patient by name and date of birth.  The procedure site was identified with all present in agreement.   The left knee was prepped with alcohol and injected with 40 mg of Kenalog and 2 mL of 0.5 % marcaine.  [facility] US unit with a variable frequency (6.0-15.0 MHz) linear transducer was used to visualize the retropatellar fat pad, patella, patellar tendon, tibia, and to ensure proper intra-articular placement of medication.  Injection image was stored in the patient's permanent chart.  The injection was without event and I will follow them as scheduled.  Discussed starting HP at follow-up if cortisone does not provide adequate pain relief.    Approximately 45 minutes was spent reviewing the medical record, imaging, performing history and physical examination, discussing the diagnosis and treatment plan with the patient, and completing documentation for this

## 2025-04-01 ENCOUNTER — HOSPITAL ENCOUNTER (EMERGENCY)
Age: 55
Discharge: HOME | End: 2025-04-01
Payer: COMMERCIAL

## 2025-04-01 VITALS
SYSTOLIC BLOOD PRESSURE: 160 MMHG | BODY MASS INDEX: 31.58 KG/M2 | HEIGHT: 64 IN | RESPIRATION RATE: 16 BRPM | DIASTOLIC BLOOD PRESSURE: 80 MMHG | OXYGEN SATURATION: 98 % | TEMPERATURE: 98 F | HEART RATE: 78 BPM | WEIGHT: 185 LBS
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
## 2025-04-01 PROCEDURE — 90472 IMMUNIZATION ADMIN EACH ADD: CPT | Performed by: NURSE PRACTITIONER

## 2025-04-01 PROCEDURE — 6360000002 HC RX W HCPCS: Performed by: NURSE PRACTITIONER

## 2025-04-01 PROCEDURE — 90714 TD VACC NO PRESV 7 YRS+ IM: CPT | Performed by: NURSE PRACTITIONER

## 2025-04-01 PROCEDURE — 96372 THER/PROPH/DIAG INJ SC/IM: CPT

## 2025-04-01 PROCEDURE — 90375 RABIES IG IM/SC: CPT | Performed by: NURSE PRACTITIONER

## 2025-04-01 PROCEDURE — 90675 RABIES VACCINE IM: CPT | Performed by: NURSE PRACTITIONER

## 2025-04-01 PROCEDURE — 90471 IMMUNIZATION ADMIN: CPT | Performed by: NURSE PRACTITIONER

## 2025-04-01 PROCEDURE — 99284 EMERGENCY DEPT VISIT MOD MDM: CPT

## 2025-04-01 RX ADMIN — RABIES IMMUNE GLOBULIN (HUMAN) 1680 UNITS: 300 INJECTION, SOLUTION INFILTRATION; INTRAMUSCULAR at 21:23

## 2025-04-01 RX ADMIN — RABIES VACCINE 1 ML: KIT at 21:20

## 2025-04-01 RX ADMIN — CLOSTRIDIUM TETANI TOXOID ANTIGEN (FORMALDEHYDE INACTIVATED) AND CORYNEBACTERIUM DIPHTHERIAE TOXOID ANTIGEN (FORMALDEHYDE INACTIVATED) 0.5 ML: 5; 2 INJECTION, SUSPENSION INTRAMUSCULAR at 21:21

## 2025-04-01 ASSESSMENT — PAIN SCALES - GENERAL
PAINLEVEL_OUTOF10: 5
PAINLEVEL_OUTOF10: 7

## 2025-04-01 ASSESSMENT — PAIN - FUNCTIONAL ASSESSMENT
PAIN_FUNCTIONAL_ASSESSMENT: 0-10
PAIN_FUNCTIONAL_ASSESSMENT: 0-10

## 2025-04-01 ASSESSMENT — PAIN DESCRIPTION - FREQUENCY: FREQUENCY: CONTINUOUS

## 2025-04-01 ASSESSMENT — PAIN DESCRIPTION - DESCRIPTORS: DESCRIPTORS: THROBBING

## 2025-04-01 ASSESSMENT — PAIN DESCRIPTION - LOCATION: LOCATION: ARM

## 2025-04-01 ASSESSMENT — PAIN DESCRIPTION - PAIN TYPE: TYPE: ACUTE PAIN

## 2025-04-01 ASSESSMENT — PAIN DESCRIPTION - ORIENTATION: ORIENTATION: LEFT

## 2025-04-02 NOTE — ED NOTES
Patient mobility status  with no difficulty.     I have reviewed discharge instructions with the patient.  The patient verbalized understanding.    Patient left ED via Discharge Method: ambulatory to Home with Friend.    Opportunity for questions and clarification provided.     Patient given 0 scripts.           Miguel Ángel Perez RN  04/01/25 1527

## 2025-04-02 NOTE — ED PROVIDER NOTES
in acute distress.     Appearance: Normal appearance. He is not ill-appearing, toxic-appearing or diaphoretic.   HENT:      Head: Normocephalic and atraumatic.   Cardiovascular:      Rate and Rhythm: Normal rate.   Pulmonary:      Effort: Pulmonary effort is normal. No respiratory distress.   Skin:     General: Skin is warm and dry.      Comments: Small laceration to left biceps area consistent with patient's report of a dog bite.    Neurological:      General: No focal deficit present.      Mental Status: He is alert and oriented to person, place, and time.        Procedures     Procedures    Orders placed during this emergency department visit:   No orders of the defined types were placed in this encounter.       Medications given during this emergency department visit:     Medications   rabies vaccine, PCEC (RABAVERT) injection 1 mL (1 mL IntraMUSCular Given 4/1/25 2120)   rabies immune globulin injection 1,680 Units (1,680 Units IntraMUSCular Given 4/1/25 2123)   tetanus & diphtheria toxoids (adult) 5-2 LFU injection 0.5 mL (0.5 mLs IntraMUSCular Given 4/1/25 2121)       New prescriptions:     Discharge Medication List as of 4/1/2025 10:45 PM        START taking these medications    Details   !! rabies vaccine, PCEC (RABAVERT) injection Inject 1 mL into the muscle See Admin Instructions To be administered by a pharmacist, health department or outpatient department in 3 days., Disp-1 each, R-0Print      !! rabies vaccine, PCEC (RABAVERT) injection Inject 1 mL into the muscle See Admin Instructions To be administered by a pharmacist, health department or outpatient department in 7 days., Disp-1 each, R-0Print      !! rabies vaccine, PCEC (RABAVERT) injection Inject 1 mL into the muscle See Admin Instructions To be administered by a pharmacist, health department or outpatient department in 14 days., Disp-1 each, R-0Print       !! - Potential duplicate medications found. Please discuss with provider.           Past

## 2025-04-02 NOTE — DISCHARGE INSTRUCTIONS
Wash the wound gently with soap and water twice a day.  As we discussed, the rabies is a series of vaccines.  Please follow the below vaccine schedule.  You may go to the [facility] urgent care off of [facility] for follow-up vaccines.    Indication: Rabies postexposure prophylaxis  Prescription date: 4/1/2025   Time: 9:58 PM    Day 0 -  4/1/2025 - Given in ED  Day 3 -  4/4/2025  Day 7-   4/8/2025  Day 14- 4/15/2025          Call and follow up with the urgent care for completion of your vaccinations    [facility] [address]  [address] [place], [place] [id number] [phone]

## 2025-04-02 NOTE — ED TRIAGE NOTES
Pt arrived via POV unaccompanied. He is ambulatory to triage c/o he was attacked by a dog. He has a bite to his left upper arm, above the ac. Unsure if the dog was someone's pet or a stray. It looked like a Cane Debbie, it was a massive dog. Unsure of last tetanus.

## 2025-08-29 ENCOUNTER — TELEPHONE (OUTPATIENT)
Dept: ORTHOPEDIC SURGERY | Age: 55
End: 2025-08-29

## 2025-09-02 ENCOUNTER — TELEPHONE (OUTPATIENT)
Dept: ORTHOPEDIC SURGERY | Age: 55
End: 2025-09-02

## 2025-09-03 ENCOUNTER — OFFICE VISIT (OUTPATIENT)
Dept: ORTHOPEDIC SURGERY | Age: 55
End: 2025-09-03

## 2025-09-03 DIAGNOSIS — M17.12 OSTEOARTHRITIS OF LEFT KNEE, UNSPECIFIED OSTEOARTHRITIS TYPE: ICD-10-CM

## 2025-09-03 DIAGNOSIS — M25.561 RIGHT KNEE PAIN, UNSPECIFIED CHRONICITY: Primary | ICD-10-CM

## 2025-09-03 DIAGNOSIS — M17.11 OSTEOARTHRITIS OF RIGHT KNEE, UNSPECIFIED OSTEOARTHRITIS TYPE: ICD-10-CM

## 2025-09-03 RX ORDER — TRIAMCINOLONE ACETONIDE 40 MG/ML
40 INJECTION, SUSPENSION INTRA-ARTICULAR; INTRAMUSCULAR ONCE
Status: COMPLETED | OUTPATIENT
Start: 2025-09-03 | End: 2025-09-03

## 2025-09-03 RX ADMIN — TRIAMCINOLONE ACETONIDE 40 MG: 40 INJECTION, SUSPENSION INTRA-ARTICULAR; INTRAMUSCULAR at 09:39

## 2025-09-03 RX ADMIN — TRIAMCINOLONE ACETONIDE 40 MG: 40 INJECTION, SUSPENSION INTRA-ARTICULAR; INTRAMUSCULAR at 08:43

## (undated) DEVICE — NDL PRT INJ NSAF BLNT 18GX1.5 --

## (undated) DEVICE — KENDALL RADIOLUCENT FOAM MONITORING ELECTRODE RECTANGULAR SHAPE: Brand: KENDALL

## (undated) DEVICE — SYR 5ML 1/5 GRAD LL NSAF LF --

## (undated) DEVICE — CANNULA NSL ORAL AD FOR CAPNOFLEX CO2 O2 AIRLFE

## (undated) DEVICE — SYR 3ML LL TIP 1/10ML GRAD --

## (undated) DEVICE — CONNECTOR TBNG OD5-7MM O2 END DISP